# Patient Record
Sex: MALE | Race: WHITE
[De-identification: names, ages, dates, MRNs, and addresses within clinical notes are randomized per-mention and may not be internally consistent; named-entity substitution may affect disease eponyms.]

---

## 2018-01-02 PROBLEM — Z00.00 ENCOUNTER FOR PREVENTIVE HEALTH EXAMINATION: Status: ACTIVE | Noted: 2018-01-02

## 2019-05-20 ENCOUNTER — RECORD ABSTRACTING (OUTPATIENT)
Age: 74
End: 2019-05-20

## 2019-05-20 DIAGNOSIS — Z87.898 PERSONAL HISTORY OF OTHER SPECIFIED CONDITIONS: ICD-10-CM

## 2019-05-20 DIAGNOSIS — Z78.9 OTHER SPECIFIED HEALTH STATUS: ICD-10-CM

## 2019-05-20 DIAGNOSIS — Z92.21 PERSONAL HISTORY OF ANTINEOPLASTIC CHEMOTHERAPY: ICD-10-CM

## 2019-05-20 DIAGNOSIS — Z87.891 PERSONAL HISTORY OF NICOTINE DEPENDENCE: ICD-10-CM

## 2019-05-20 DIAGNOSIS — E07.9 DISORDER OF THYROID, UNSPECIFIED: ICD-10-CM

## 2019-05-20 DIAGNOSIS — I25.2 OLD MYOCARDIAL INFARCTION: ICD-10-CM

## 2019-05-20 DIAGNOSIS — Z80.9 FAMILY HISTORY OF MALIGNANT NEOPLASM, UNSPECIFIED: ICD-10-CM

## 2019-05-20 DIAGNOSIS — Z85.9 PERSONAL HISTORY OF MALIGNANT NEOPLASM, UNSPECIFIED: ICD-10-CM

## 2019-05-20 DIAGNOSIS — Z86.73 PERSONAL HISTORY OF TRANSIENT ISCHEMIC ATTACK (TIA), AND CEREBRAL INFARCTION W/OUT RESIDUAL DEFICITS: ICD-10-CM

## 2019-05-20 DIAGNOSIS — K21.9 GASTRO-ESOPHAGEAL REFLUX DISEASE W/OUT ESOPHAGITIS: ICD-10-CM

## 2019-05-20 DIAGNOSIS — Z86.2 PERSONAL HISTORY OF DISEASES OF THE BLOOD AND BLOOD-FORMING ORGANS AND CERTAIN DISORDERS INVOLVING THE IMMUNE MECHANISM: ICD-10-CM

## 2019-05-20 DIAGNOSIS — Z87.19 PERSONAL HISTORY OF OTHER DISEASES OF THE DIGESTIVE SYSTEM: ICD-10-CM

## 2019-05-20 DIAGNOSIS — E78.00 PURE HYPERCHOLESTEROLEMIA, UNSPECIFIED: ICD-10-CM

## 2019-05-20 DIAGNOSIS — Z82.49 FAMILY HISTORY OF ISCHEMIC HEART DISEASE AND OTHER DISEASES OF THE CIRCULATORY SYSTEM: ICD-10-CM

## 2019-05-29 ENCOUNTER — RECORD ABSTRACTING (OUTPATIENT)
Age: 74
End: 2019-05-29

## 2019-05-29 DIAGNOSIS — Z83.3 FAMILY HISTORY OF DIABETES MELLITUS: ICD-10-CM

## 2019-05-29 DIAGNOSIS — Z86.79 PERSONAL HISTORY OF OTHER DISEASES OF THE CIRCULATORY SYSTEM: ICD-10-CM

## 2019-05-29 DIAGNOSIS — Z95.810 PRESENCE OF AUTOMATIC (IMPLANTABLE) CARDIAC DEFIBRILLATOR: ICD-10-CM

## 2019-05-29 DIAGNOSIS — Z87.891 PERSONAL HISTORY OF NICOTINE DEPENDENCE: ICD-10-CM

## 2019-06-06 ENCOUNTER — RX RENEWAL (OUTPATIENT)
Age: 74
End: 2019-06-06

## 2019-06-14 ENCOUNTER — RX CHANGE (OUTPATIENT)
Age: 74
End: 2019-06-14

## 2019-06-14 RX ORDER — EMPAGLIFLOZIN, METFORMIN HYDROCHLORIDE 12.5; 1 MG/1; MG/1
12.5-1 TABLET, EXTENDED RELEASE ORAL
Qty: 180 | Refills: 0 | Status: DISCONTINUED | COMMUNITY
Start: 1900-01-01 | End: 2019-06-14

## 2019-08-05 ENCOUNTER — RX RENEWAL (OUTPATIENT)
Age: 74
End: 2019-08-05

## 2019-09-11 ENCOUNTER — APPOINTMENT (OUTPATIENT)
Dept: FAMILY MEDICINE | Facility: CLINIC | Age: 74
End: 2019-09-11
Payer: COMMERCIAL

## 2019-09-11 VITALS
WEIGHT: 193.25 LBS | HEART RATE: 87 BPM | HEIGHT: 64.5 IN | TEMPERATURE: 97.7 F | BODY MASS INDEX: 32.59 KG/M2 | OXYGEN SATURATION: 98 %

## 2019-09-11 VITALS — SYSTOLIC BLOOD PRESSURE: 127 MMHG | DIASTOLIC BLOOD PRESSURE: 70 MMHG

## 2019-09-11 PROCEDURE — 99213 OFFICE O/P EST LOW 20 MIN: CPT

## 2019-09-11 RX ORDER — GLIPIZIDE 5 MG/1
5 TABLET, FILM COATED, EXTENDED RELEASE ORAL
Refills: 0 | Status: DISCONTINUED | COMMUNITY
End: 2019-09-11

## 2019-09-11 NOTE — PHYSICAL EXAM
[No Acute Distress] : no acute distress [Well Nourished] : well nourished [Well Developed] : well developed [Well-Appearing] : well-appearing [Normal Sclera/Conjunctiva] : normal sclera/conjunctiva [PERRL] : pupils equal round and reactive to light [EOMI] : extraocular movements intact [Normal Outer Ear/Nose] : the outer ears and nose were normal in appearance [Normal Oropharynx] : the oropharynx was normal [No JVD] : no jugular venous distention [No Lymphadenopathy] : no lymphadenopathy [Supple] : supple [Thyroid Normal, No Nodules] : the thyroid was normal and there were no nodules present [No Accessory Muscle Use] : no accessory muscle use [No Respiratory Distress] : no respiratory distress  [Clear to Auscultation] : lungs were clear to auscultation bilaterally [Normal Rate] : normal rate  [Regular Rhythm] : with a regular rhythm [Normal S1, S2] : normal S1 and S2 [No Carotid Bruits] : no carotid bruits [No Murmur] : no murmur heard [No Abdominal Bruit] : a ~M bruit was not heard ~T in the abdomen [No Varicosities] : no varicosities [Pedal Pulses Present] : the pedal pulses are present [No Edema] : there was no peripheral edema [No Palpable Aorta] : no palpable aorta [No Extremity Clubbing/Cyanosis] : no extremity clubbing/cyanosis [Soft] : abdomen soft [Non Tender] : non-tender [Non-distended] : non-distended [No Masses] : no abdominal mass palpated [No HSM] : no HSM [Normal Bowel Sounds] : normal bowel sounds [Normal Posterior Cervical Nodes] : no posterior cervical lymphadenopathy [Normal Anterior Cervical Nodes] : no anterior cervical lymphadenopathy [No CVA Tenderness] : no CVA  tenderness [No Spinal Tenderness] : no spinal tenderness [No Joint Swelling] : no joint swelling [Grossly Normal Strength/Tone] : grossly normal strength/tone [No Rash] : no rash [Coordination Grossly Intact] : coordination grossly intact [No Focal Deficits] : no focal deficits [Normal Gait] : normal gait [Deep Tendon Reflexes (DTR)] : deep tendon reflexes were 2+ and symmetric [Normal Affect] : the affect was normal [Normal Insight/Judgement] : insight and judgment were intact

## 2019-12-11 ENCOUNTER — APPOINTMENT (OUTPATIENT)
Dept: FAMILY MEDICINE | Facility: CLINIC | Age: 74
End: 2019-12-11
Payer: COMMERCIAL

## 2019-12-11 VITALS — DIASTOLIC BLOOD PRESSURE: 75 MMHG | SYSTOLIC BLOOD PRESSURE: 125 MMHG

## 2019-12-11 VITALS
BODY MASS INDEX: 33.19 KG/M2 | HEIGHT: 64 IN | OXYGEN SATURATION: 97 % | WEIGHT: 194.4 LBS | HEART RATE: 84 BPM | RESPIRATION RATE: 12 BRPM | TEMPERATURE: 97.6 F

## 2019-12-11 PROCEDURE — 90670 PCV13 VACCINE IM: CPT

## 2019-12-11 PROCEDURE — G0009: CPT

## 2019-12-11 PROCEDURE — 99213 OFFICE O/P EST LOW 20 MIN: CPT | Mod: 25

## 2019-12-11 NOTE — ASSESSMENT
[FreeTextEntry1] : WF for recheck of blood sugars (which have not been in good control not watching diet). Bp eval well controlled. Will renew meds and [id number] requested.

## 2019-12-11 NOTE — PHYSICAL EXAM
[No Acute Distress] : no acute distress [Well Nourished] : well nourished [Well Developed] : well developed [Well-Appearing] : well-appearing [Normal Sclera/Conjunctiva] : normal sclera/conjunctiva [PERRL] : pupils equal round and reactive to light [EOMI] : extraocular movements intact [Normal Outer Ear/Nose] : the outer ears and nose were normal in appearance [Normal Oropharynx] : the oropharynx was normal [No JVD] : no jugular venous distention [No Lymphadenopathy] : no lymphadenopathy [Supple] : supple [No Respiratory Distress] : no respiratory distress  [Thyroid Normal, No Nodules] : the thyroid was normal and there were no nodules present [No Accessory Muscle Use] : no accessory muscle use [Normal Rate] : normal rate  [Regular Rhythm] : with a regular rhythm [Clear to Auscultation] : lungs were clear to auscultation bilaterally [No Carotid Bruits] : no carotid bruits [Normal S1, S2] : normal S1 and S2 [No Murmur] : no murmur heard [No Abdominal Bruit] : a ~M bruit was not heard ~T in the abdomen [Pedal Pulses Present] : the pedal pulses are present [No Varicosities] : no varicosities [No Edema] : there was no peripheral edema [No Palpable Aorta] : no palpable aorta [No Extremity Clubbing/Cyanosis] : no extremity clubbing/cyanosis [Soft] : abdomen soft [Non Tender] : non-tender [Non-distended] : non-distended [No Masses] : no abdominal mass palpated [No HSM] : no HSM [Normal Bowel Sounds] : normal bowel sounds [Normal Anterior Cervical Nodes] : no anterior cervical lymphadenopathy [Normal Posterior Cervical Nodes] : no posterior cervical lymphadenopathy [No Joint Swelling] : no joint swelling [No CVA Tenderness] : no CVA  tenderness [No Spinal Tenderness] : no spinal tenderness [Coordination Grossly Intact] : coordination grossly intact [No Rash] : no rash [Grossly Normal Strength/Tone] : grossly normal strength/tone [Normal Gait] : normal gait [Deep Tendon Reflexes (DTR)] : deep tendon reflexes were 2+ and symmetric [No Focal Deficits] : no focal deficits [Normal Insight/Judgement] : insight and judgment were intact [Normal Affect] : the affect was normal

## 2020-03-11 ENCOUNTER — APPOINTMENT (OUTPATIENT)
Dept: FAMILY MEDICINE | Facility: CLINIC | Age: 75
End: 2020-03-11
Payer: COMMERCIAL

## 2020-03-11 VITALS
TEMPERATURE: 98 F | HEIGHT: 64 IN | BODY MASS INDEX: 33.29 KG/M2 | HEART RATE: 84 BPM | OXYGEN SATURATION: 98 % | RESPIRATION RATE: 12 BRPM | WEIGHT: 195 LBS

## 2020-03-11 VITALS — DIASTOLIC BLOOD PRESSURE: 65 MMHG | SYSTOLIC BLOOD PRESSURE: 120 MMHG

## 2020-03-11 PROCEDURE — 36415 COLL VENOUS BLD VENIPUNCTURE: CPT

## 2020-03-11 PROCEDURE — 99213 OFFICE O/P EST LOW 20 MIN: CPT | Mod: 25

## 2020-03-11 PROCEDURE — 81003 URINALYSIS AUTO W/O SCOPE: CPT | Mod: QW

## 2020-03-13 LAB
ALBUMIN SERPL ELPH-MCNC: 4.7 G/DL
ALP BLD-CCNC: 71 U/L
ALT SERPL-CCNC: 66 U/L
ANION GAP SERPL CALC-SCNC: 21 MMOL/L
AST SERPL-CCNC: 47 U/L
BASOPHILS # BLD AUTO: 0.06 K/UL
BASOPHILS NFR BLD AUTO: 0.9 %
BILIRUB SERPL-MCNC: 0.4 MG/DL
BILIRUB UR QL STRIP: NEGATIVE
BUN SERPL-MCNC: 21 MG/DL
CALCIUM SERPL-MCNC: 9.4 MG/DL
CHLORIDE SERPL-SCNC: 100 MMOL/L
CHOLEST SERPL-MCNC: 122 MG/DL
CHOLEST/HDLC SERPL: 3.8 RATIO
CLARITY UR: CLEAR
CO2 SERPL-SCNC: 19 MMOL/L
COLLECTION METHOD: NORMAL
CREAT SERPL-MCNC: 1.17 MG/DL
EOSINOPHIL # BLD AUTO: 0.2 K/UL
EOSINOPHIL NFR BLD AUTO: 3.1 %
ESTIMATED AVERAGE GLUCOSE: 166 MG/DL
GLUCOSE SERPL-MCNC: 153 MG/DL
GLUCOSE UR-MCNC: NORMAL
HBA1C MFR BLD HPLC: 7.4 %
HCG UR QL: 0.2 EU/DL
HCT VFR BLD CALC: 48.2 %
HDLC SERPL-MCNC: 32 MG/DL
HGB BLD-MCNC: 15.8 G/DL
HGB UR QL STRIP.AUTO: NEGATIVE
IMM GRANULOCYTES NFR BLD AUTO: 0.3 %
KETONES UR-MCNC: NEGATIVE
LDLC SERPL CALC-MCNC: 14 MG/DL
LEUKOCYTE ESTERASE UR QL STRIP: NEGATIVE
LYMPHOCYTES # BLD AUTO: 2.58 K/UL
LYMPHOCYTES NFR BLD AUTO: 40.2 %
MAN DIFF?: NORMAL
MCHC RBC-ENTMCNC: 30.3 PG
MCHC RBC-ENTMCNC: 32.8 GM/DL
MCV RBC AUTO: 92.5 FL
MONOCYTES # BLD AUTO: 0.63 K/UL
MONOCYTES NFR BLD AUTO: 9.8 %
NEUTROPHILS # BLD AUTO: 2.93 K/UL
NEUTROPHILS NFR BLD AUTO: 45.7 %
NITRITE UR QL STRIP: NEGATIVE
PH UR STRIP: 6
PLATELET # BLD AUTO: 176 K/UL
POTASSIUM SERPL-SCNC: 3.7 MMOL/L
PROT SERPL-MCNC: 7.2 G/DL
PROT UR STRIP-MCNC: NEGATIVE
PSA FREE FLD-MCNC: 34 %
PSA FREE SERPL-MCNC: 0.62 NG/ML
PSA SERPL-MCNC: 1.86 NG/ML
RBC # BLD: 5.21 M/UL
RBC # FLD: 13.2 %
SODIUM SERPL-SCNC: 140 MMOL/L
SP GR UR STRIP: 1.01
T4 FREE SERPL-MCNC: 1.2 NG/DL
TRIGL SERPL-MCNC: 382 MG/DL
TSH SERPL-ACNC: 2.05 UIU/ML
WBC # FLD AUTO: 6.42 K/UL

## 2020-07-27 ENCOUNTER — APPOINTMENT (OUTPATIENT)
Dept: FAMILY MEDICINE | Facility: CLINIC | Age: 75
End: 2020-07-27
Payer: COMMERCIAL

## 2020-07-27 VITALS
BODY MASS INDEX: 33.13 KG/M2 | WEIGHT: 193 LBS | OXYGEN SATURATION: 97 % | RESPIRATION RATE: 12 BRPM | HEART RATE: 88 BPM | TEMPERATURE: 98 F

## 2020-07-27 VITALS — DIASTOLIC BLOOD PRESSURE: 72 MMHG | SYSTOLIC BLOOD PRESSURE: 120 MMHG

## 2020-07-27 PROCEDURE — 99213 OFFICE O/P EST LOW 20 MIN: CPT

## 2020-07-27 NOTE — PLAN
[FreeTextEntry1] : This is dictation summary on this 74-year-old white male Tom palmer who is coming to have his blood pressure and prescriptions are renewed the patient feels very very well no complaints he has a history of hypertension and diabetes his blood pressure was excellent today it was O1 25/72 his heart had a regular rhythm S1-S2 lungs were clear neck was supple as mentioned he has no complaints his blood pressure medication was changed from valsartan to low saw a cardiologist because they're having trouble getting the L. side his medications are renewed and dry to 12.5/500 as well as she dorsiflexed touch 100 unit TENS the patient as mentioned was asymptomatic and has been instructed to return to the office in 3 months his prescriptions were refilled today 5 diagnoses hypertension diabetes

## 2020-09-29 DIAGNOSIS — Z23 ENCOUNTER FOR IMMUNIZATION: ICD-10-CM

## 2020-09-30 ENCOUNTER — APPOINTMENT (OUTPATIENT)
Dept: FAMILY MEDICINE | Facility: CLINIC | Age: 75
End: 2020-09-30
Payer: COMMERCIAL

## 2020-09-30 PROCEDURE — G0008: CPT

## 2020-09-30 PROCEDURE — 90662 IIV NO PRSV INCREASED AG IM: CPT

## 2020-10-01 NOTE — PROCEDURE
[Injection] : Injection [Left] : on the left.   [Bleeding] : bleeding [Infection] : infection [Allergic Reaction] : allergic reaction [Patient] : patient [Risk] : Risk [Benefits] : benefits [Verbal Consent Obtained] : verbal consent was obtained prior to the procedure [Alcohol] : Alcohol [Bandage Applied] : a bandage [Tolerated Well] : the patient tolerated the procedure well [None] : None [de-identified] : deltoid [de-identified] : health maintenance [FreeTextEntry1] : seasonal flu vaccine 65+ [FreeTextEntry2] : health maintenance [FreeTextEntry3] : Pt presents to office for seaonal flu vaccine. Patient is asymptomatic, Denies fever, hx of Guillian-Assonet Syndrome or allergy to eggs. Provided patient with education regarding seasonal flu vaccine. Pt tolerated injection with no s/s of adverse reactions noted at time of visit.

## 2020-10-01 NOTE — REASON FOR VISIT
[Procedure: _________] : a [unfilled] procedure visit [FreeTextEntry1] : patient is here for seasonal flu vaccine.

## 2020-10-28 ENCOUNTER — NON-APPOINTMENT (OUTPATIENT)
Age: 75
End: 2020-10-28

## 2020-11-04 ENCOUNTER — APPOINTMENT (OUTPATIENT)
Dept: FAMILY MEDICINE | Facility: CLINIC | Age: 75
End: 2020-11-04
Payer: COMMERCIAL

## 2020-11-04 VITALS
TEMPERATURE: 98 F | BODY MASS INDEX: 33.78 KG/M2 | RESPIRATION RATE: 12 BRPM | HEART RATE: 78 BPM | OXYGEN SATURATION: 98 % | WEIGHT: 196.8 LBS

## 2020-11-04 VITALS — SYSTOLIC BLOOD PRESSURE: 135 MMHG | DIASTOLIC BLOOD PRESSURE: 75 MMHG

## 2020-11-04 PROCEDURE — 99213 OFFICE O/P EST LOW 20 MIN: CPT | Mod: 25

## 2020-11-04 PROCEDURE — 99072 ADDL SUPL MATRL&STAF TM PHE: CPT

## 2020-11-04 NOTE — ASSESSMENT
[FreeTextEntry1] : This is a narrative dictation on this 75-year-old male history of diabetes and hypertension who is coming for refill of his diabetic medications he feels well he brought in his blood sugars which he checks twice a day every day and they have been very high and he realizes that he admits also that he just eats like there is Dr. Guillaume at home now working at home and he sits all day the nausea cc coughs septum was in [place] her sugars are high he would he want to tell you that I can do is increase his medications so he says is going to try harder\par I with everything the way this pressure was excellent 135/75 is a regular rhythm S1-S2 lungs are clear showed sugars a lot of times it is in the 200 range I renewed his medications as is he'll return to the office for another recheck and brings his hopefully his numbers with him

## 2020-11-05 ENCOUNTER — NON-APPOINTMENT (OUTPATIENT)
Age: 75
End: 2020-11-05

## 2020-11-06 ENCOUNTER — NON-APPOINTMENT (OUTPATIENT)
Age: 75
End: 2020-11-06

## 2020-11-12 ENCOUNTER — NON-APPOINTMENT (OUTPATIENT)
Age: 75
End: 2020-11-12

## 2021-02-03 ENCOUNTER — NON-APPOINTMENT (OUTPATIENT)
Age: 76
End: 2021-02-03

## 2021-02-03 ENCOUNTER — APPOINTMENT (OUTPATIENT)
Dept: FAMILY MEDICINE | Facility: CLINIC | Age: 76
End: 2021-02-03
Payer: COMMERCIAL

## 2021-02-03 VITALS
OXYGEN SATURATION: 98 % | TEMPERATURE: 95.5 F | HEART RATE: 78 BPM | RESPIRATION RATE: 12 BRPM | BODY MASS INDEX: 33.3 KG/M2 | WEIGHT: 194 LBS

## 2021-02-03 VITALS — DIASTOLIC BLOOD PRESSURE: 75 MMHG | SYSTOLIC BLOOD PRESSURE: 130 MMHG

## 2021-02-03 PROCEDURE — 99212 OFFICE O/P EST SF 10 MIN: CPT

## 2021-02-03 PROCEDURE — 99072 ADDL SUPL MATRL&STAF TM PHE: CPT

## 2021-02-03 NOTE — ASSESSMENT
[FreeTextEntry1] : Visit narrative dictation for this 75-year-old white male who is coming today for just a little checkup he was recently seen by his cardiologist who was supposed to have a forwarded to live status switch to we have not received yet so were rather than B. repeat some blood tests on here waiting to get the fax from the cardiologist and the patient will come back and I will fill in and whatever lab studies I want one of which will include a hemoglobin A1c the patient has a history of diabetes center and holidays he sort of did not follow her diet as well as I would've liked to however he says he is back to watching his diet and we will see how he uses next appointment which is approximately 3 months from now did not require a refill on any medications at this time and as mentioned I will see him back in the office his blood pressure was 130/75

## 2021-02-17 ENCOUNTER — RX RENEWAL (OUTPATIENT)
Age: 76
End: 2021-02-17

## 2021-05-05 ENCOUNTER — APPOINTMENT (OUTPATIENT)
Dept: FAMILY MEDICINE | Facility: CLINIC | Age: 76
End: 2021-05-05
Payer: COMMERCIAL

## 2021-05-05 VITALS
RESPIRATION RATE: 14 BRPM | HEART RATE: 80 BPM | OXYGEN SATURATION: 98 % | TEMPERATURE: 97.2 F | BODY MASS INDEX: 33.71 KG/M2 | WEIGHT: 196.4 LBS

## 2021-05-05 PROCEDURE — 36415 COLL VENOUS BLD VENIPUNCTURE: CPT

## 2021-05-05 PROCEDURE — 99213 OFFICE O/P EST LOW 20 MIN: CPT | Mod: 25

## 2021-05-05 PROCEDURE — 99072 ADDL SUPL MATRL&STAF TM PHE: CPT

## 2021-05-05 NOTE — ASSESSMENT
[FreeTextEntry1] : As an out of dictation this 75-year-old male who is coming in today for reevaluation patient has a history of hypertension and diabetes he sugars he states later in the day are very normal range definitely under 200 sometimes in the 130 range however his fasting sugars in the morning still in the 150+ range  he swears to me he isn't having any carbs at bedtime  his blood pressure was normal I renewed his prescriptions decided to send him to an endocrinologist for further evaluation patient agrees to this a name has been given to him to make an appointment

## 2021-05-07 LAB
ESTIMATED AVERAGE GLUCOSE: 183 MG/DL
HBA1C MFR BLD HPLC: 8 %

## 2021-05-19 ENCOUNTER — RX RENEWAL (OUTPATIENT)
Age: 76
End: 2021-05-19

## 2021-07-06 ENCOUNTER — RX RENEWAL (OUTPATIENT)
Age: 76
End: 2021-07-06

## 2021-08-03 ENCOUNTER — RX RENEWAL (OUTPATIENT)
Age: 76
End: 2021-08-03

## 2021-08-11 ENCOUNTER — LABORATORY RESULT (OUTPATIENT)
Age: 76
End: 2021-08-11

## 2021-08-11 ENCOUNTER — APPOINTMENT (OUTPATIENT)
Dept: FAMILY MEDICINE | Facility: CLINIC | Age: 76
End: 2021-08-11
Payer: COMMERCIAL

## 2021-08-11 VITALS
WEIGHT: 198.4 LBS | HEART RATE: 82 BPM | TEMPERATURE: 96.5 F | RESPIRATION RATE: 14 BRPM | OXYGEN SATURATION: 98 % | BODY MASS INDEX: 34.06 KG/M2

## 2021-08-11 VITALS — DIASTOLIC BLOOD PRESSURE: 75 MMHG | SYSTOLIC BLOOD PRESSURE: 125 MMHG

## 2021-08-11 DIAGNOSIS — I10 ESSENTIAL (PRIMARY) HYPERTENSION: ICD-10-CM

## 2021-08-11 DIAGNOSIS — E11.9 TYPE 2 DIABETES MELLITUS W/OUT COMPLICATIONS: ICD-10-CM

## 2021-08-11 PROCEDURE — 99213 OFFICE O/P EST LOW 20 MIN: CPT | Mod: 25

## 2021-08-11 PROCEDURE — 81003 URINALYSIS AUTO W/O SCOPE: CPT | Mod: QW

## 2021-08-11 PROCEDURE — 36415 COLL VENOUS BLD VENIPUNCTURE: CPT

## 2021-08-11 NOTE — ASSESSMENT
[FreeTextEntry1] : As an out of dictation for this 75-year-old white male who was come in for blood pressure recheck and to go over his diabetes status he also is requesting some lab studies which he has had a long talk he showed me his sugar controls which are averaging approximately 150 7 in the evening and 150 average in the morning his blood pressure one was 125/75 his heart had a regular rhythm S1-S2 lungs were clear I stressed to the patient how important it is to stand a low-carb diet is telling me it's difficult during the pain then it because he's home and gets up all the time and wants the surgery though I can explain to him with a moderately half he says he will try ID comprehensive lab studies on him today including a hemoglobin A1c he has enough prescriptions and that he will be notified of the results when they return to me

## 2021-08-16 ENCOUNTER — RX RENEWAL (OUTPATIENT)
Age: 76
End: 2021-08-16

## 2021-08-18 LAB
ALBUMIN SERPL ELPH-MCNC: 5 G/DL
ALP BLD-CCNC: 88 U/L
ALT SERPL-CCNC: 86 U/L
ANION GAP SERPL CALC-SCNC: 15 MMOL/L
AST SERPL-CCNC: 58 U/L
BASOPHILS # BLD AUTO: 0.06 K/UL
BASOPHILS NFR BLD AUTO: 0.8 %
BILIRUB SERPL-MCNC: 0.6 MG/DL
BILIRUB UR QL STRIP: NORMAL
BUN SERPL-MCNC: 19 MG/DL
CALCIUM SERPL-MCNC: 9.6 MG/DL
CHLORIDE SERPL-SCNC: 101 MMOL/L
CHOLEST SERPL-MCNC: 140 MG/DL
CLARITY UR: CLEAR
CO2 SERPL-SCNC: 22 MMOL/L
COLLECTION METHOD: NORMAL
CREAT SERPL-MCNC: 1.03 MG/DL
EOSINOPHIL # BLD AUTO: 0.25 K/UL
EOSINOPHIL NFR BLD AUTO: 3.1 %
ESTIMATED AVERAGE GLUCOSE: 206 MG/DL
GLUCOSE SERPL-MCNC: 114 MG/DL
GLUCOSE UR-MCNC: NORMAL
HBA1C MFR BLD HPLC: 8.8 %
HCG UR QL: 0.2 EU/DL
HCT VFR BLD CALC: 47.9 %
HDLC SERPL-MCNC: 33 MG/DL
HGB BLD-MCNC: 15.9 G/DL
HGB UR QL STRIP.AUTO: NORMAL
IMM GRANULOCYTES NFR BLD AUTO: 0.5 %
KETONES UR-MCNC: NORMAL
LDLC SERPL CALC-MCNC: 45 MG/DL
LEUKOCYTE ESTERASE UR QL STRIP: NORMAL
LYMPHOCYTES # BLD AUTO: 3.39 K/UL
LYMPHOCYTES NFR BLD AUTO: 42.5 %
MAN DIFF?: NORMAL
MCHC RBC-ENTMCNC: 31.2 PG
MCHC RBC-ENTMCNC: 33.2 GM/DL
MCV RBC AUTO: 94.1 FL
MONOCYTES # BLD AUTO: 0.59 K/UL
MONOCYTES NFR BLD AUTO: 7.4 %
NEUTROPHILS # BLD AUTO: 3.64 K/UL
NEUTROPHILS NFR BLD AUTO: 45.7 %
NITRITE UR QL STRIP: NORMAL
NONHDLC SERPL-MCNC: 107 MG/DL
PH UR STRIP: 6
PLATELET # BLD AUTO: 175 K/UL
POTASSIUM SERPL-SCNC: 3.9 MMOL/L
PROT SERPL-MCNC: 7.3 G/DL
PROT UR STRIP-MCNC: NORMAL
PSA FREE FLD-MCNC: 34 %
PSA FREE SERPL-MCNC: 0.62 NG/ML
PSA SERPL-MCNC: 1.82 NG/ML
RBC # BLD: 5.09 M/UL
RBC # FLD: 13.2 %
SODIUM SERPL-SCNC: 138 MMOL/L
SP GR UR STRIP: 1.02
T4 FREE SERPL-MCNC: 1.2 NG/DL
TRIGL SERPL-MCNC: 311 MG/DL
TSH SERPL-ACNC: 1.8 UIU/ML
WBC # FLD AUTO: 7.97 K/UL

## 2021-08-19 ENCOUNTER — NON-APPOINTMENT (OUTPATIENT)
Age: 76
End: 2021-08-19

## 2021-08-30 ENCOUNTER — APPOINTMENT (OUTPATIENT)
Dept: FAMILY MEDICINE | Facility: CLINIC | Age: 76
End: 2021-08-30
Payer: COMMERCIAL

## 2021-08-30 VITALS — HEART RATE: 74 BPM | RESPIRATION RATE: 14 BRPM | TEMPERATURE: 97.1 F | OXYGEN SATURATION: 98 %

## 2021-08-30 DIAGNOSIS — Z91.030 BEE ALLERGY STATUS: ICD-10-CM

## 2021-08-30 PROCEDURE — 99213 OFFICE O/P EST LOW 20 MIN: CPT

## 2021-08-30 RX ORDER — EPINEPHRINE 0.3 MG/.3ML
0.3 INJECTION INTRAMUSCULAR
Qty: 1 | Refills: 0 | Status: ACTIVE | COMMUNITY
Start: 2021-08-30 | End: 1900-01-01

## 2021-08-30 NOTE — HISTORY OF PRESENT ILLNESS
[Post-hospitalization from ___ Hospital] : Post-hospitalization from [unfilled] Hospital [Admitted on: ___] : The patient was admitted on [unfilled] [Discharged on ___] : discharged on [unfilled]

## 2021-09-08 ENCOUNTER — APPOINTMENT (OUTPATIENT)
Dept: FAMILY MEDICINE | Facility: CLINIC | Age: 76
End: 2021-09-08
Payer: COMMERCIAL

## 2021-09-08 VITALS — TEMPERATURE: 97.2 F | HEART RATE: 83 BPM | OXYGEN SATURATION: 97 %

## 2021-09-08 DIAGNOSIS — L25.9 UNSPECIFIED CONTACT DERMATITIS, UNSPECIFIED CAUSE: ICD-10-CM

## 2021-09-08 PROCEDURE — 36415 COLL VENOUS BLD VENIPUNCTURE: CPT

## 2021-09-08 PROCEDURE — 99213 OFFICE O/P EST LOW 20 MIN: CPT | Mod: 25

## 2021-09-08 RX ORDER — METHYLPREDNISOLONE 4 MG/1
4 TABLET ORAL
Qty: 1 | Refills: 0 | Status: ACTIVE | COMMUNITY
Start: 2021-09-08 | End: 1900-01-01

## 2021-09-08 NOTE — ASSESSMENT
[FreeTextEntry1] : Visit narrative dictation on this 75-year-old male who came in with a swollen right face with severe itching the rash is a generalized erythema with some swelling however it's only in the area and the skin is exposed to sunlight durations by his neck in a V. shape but doesn't go lower back to his chest back or extremities it appears to be to be a contact dermatitis of the Zoloft tight the only thing that he takes on his face as a cosmetic-like material his blood soap I told to immediately stop it and to use basis soap and moisturizer something simple like Lubriderm orthopedic cream we will send out a CBC with differential to see what the eosinophil count is at time on a Medrol dose pack and the patient is diabetic so I told him to watch his sugars because the prednisone will make his sugars go up we will get a dermatologist to see him he is having trouble getting a dermatologist at the earliest appointment he can get his over month we will contact tomorrow

## 2021-09-09 LAB
BASOPHILS # BLD AUTO: 0.09 K/UL
BASOPHILS NFR BLD AUTO: 1 %
EOSINOPHIL # BLD AUTO: 0.65 K/UL
EOSINOPHIL NFR BLD AUTO: 7.2 %
HCT VFR BLD CALC: 49.4 %
HGB BLD-MCNC: 16.1 G/DL
IMM GRANULOCYTES NFR BLD AUTO: 0.4 %
LYMPHOCYTES # BLD AUTO: 3.09 K/UL
LYMPHOCYTES NFR BLD AUTO: 34.4 %
MAN DIFF?: NORMAL
MCHC RBC-ENTMCNC: 31.3 PG
MCHC RBC-ENTMCNC: 32.6 GM/DL
MCV RBC AUTO: 95.9 FL
MONOCYTES # BLD AUTO: 0.7 K/UL
MONOCYTES NFR BLD AUTO: 7.8 %
NEUTROPHILS # BLD AUTO: 4.4 K/UL
NEUTROPHILS NFR BLD AUTO: 49.2 %
PLATELET # BLD AUTO: 179 K/UL
RBC # BLD: 5.15 M/UL
RBC # FLD: 13.6 %
WBC # FLD AUTO: 8.97 K/UL

## 2021-09-10 ENCOUNTER — APPOINTMENT (OUTPATIENT)
Dept: DERMATOLOGY | Facility: CLINIC | Age: 76
End: 2021-09-10
Payer: COMMERCIAL

## 2021-09-10 PROCEDURE — 99204 OFFICE O/P NEW MOD 45 MIN: CPT

## 2021-09-17 ENCOUNTER — APPOINTMENT (OUTPATIENT)
Dept: DERMATOLOGY | Facility: CLINIC | Age: 76
End: 2021-09-17

## 2021-09-23 ENCOUNTER — APPOINTMENT (OUTPATIENT)
Dept: DERMATOLOGY | Facility: CLINIC | Age: 76
End: 2021-09-23
Payer: COMMERCIAL

## 2021-09-23 PROCEDURE — 99213 OFFICE O/P EST LOW 20 MIN: CPT

## 2021-09-29 ENCOUNTER — APPOINTMENT (OUTPATIENT)
Dept: FAMILY MEDICINE | Facility: CLINIC | Age: 76
End: 2021-09-29

## 2021-09-29 ENCOUNTER — APPOINTMENT (OUTPATIENT)
Dept: FAMILY MEDICINE | Facility: CLINIC | Age: 76
End: 2021-09-29
Payer: COMMERCIAL

## 2021-09-29 PROCEDURE — G0008: CPT

## 2021-09-29 PROCEDURE — 90662 IIV NO PRSV INCREASED AG IM: CPT

## 2021-10-20 ENCOUNTER — RX RENEWAL (OUTPATIENT)
Age: 76
End: 2021-10-20

## 2021-11-10 ENCOUNTER — APPOINTMENT (OUTPATIENT)
Dept: FAMILY MEDICINE | Facility: CLINIC | Age: 76
End: 2021-11-10
Payer: COMMERCIAL

## 2021-11-10 VITALS
WEIGHT: 198.4 LBS | BODY MASS INDEX: 34.06 KG/M2 | TEMPERATURE: 97.9 F | RESPIRATION RATE: 14 BRPM | HEART RATE: 83 BPM | OXYGEN SATURATION: 98 %

## 2021-11-10 VITALS — SYSTOLIC BLOOD PRESSURE: 125 MMHG | DIASTOLIC BLOOD PRESSURE: 65 MMHG

## 2021-11-10 PROCEDURE — 99214 OFFICE O/P EST MOD 30 MIN: CPT

## 2021-11-10 NOTE — ASSESSMENT
[FreeTextEntry1] : Visit notification for a 76-year-old male who is coming today to go over his diabetic regimen patient sugars are going higher he admits that he is not following any diet and is eating quite a bit more his weight hasn't changed his weight in at about 186 pounds plus pressure however was excellent 125/65 somewhat irregular rhythm S1-S2 his lungs are clear we had a talk about diet and the consequences of not following a diet and just increasing the dosage he was taken to see about 40 units and increased now to 45 units and he will return in 3 months but he will be weight again in a couple weeks he's coming in for his covid booster

## 2021-11-15 ENCOUNTER — RX RENEWAL (OUTPATIENT)
Age: 76
End: 2021-11-15

## 2021-11-19 ENCOUNTER — APPOINTMENT (OUTPATIENT)
Dept: FAMILY MEDICINE | Facility: CLINIC | Age: 76
End: 2021-11-19

## 2022-01-24 ENCOUNTER — RX RENEWAL (OUTPATIENT)
Age: 77
End: 2022-01-24

## 2022-01-26 ENCOUNTER — RX RENEWAL (OUTPATIENT)
Age: 77
End: 2022-01-26

## 2022-02-14 ENCOUNTER — RX RENEWAL (OUTPATIENT)
Age: 77
End: 2022-02-14

## 2022-03-16 ENCOUNTER — APPOINTMENT (OUTPATIENT)
Dept: FAMILY MEDICINE | Facility: CLINIC | Age: 77
End: 2022-03-16
Payer: COMMERCIAL

## 2022-03-16 VITALS
TEMPERATURE: 97.6 F | BODY MASS INDEX: 33.46 KG/M2 | OXYGEN SATURATION: 97 % | HEART RATE: 80 BPM | WEIGHT: 196 LBS | HEIGHT: 64 IN

## 2022-03-16 VITALS — DIASTOLIC BLOOD PRESSURE: 70 MMHG | SYSTOLIC BLOOD PRESSURE: 130 MMHG

## 2022-03-16 PROCEDURE — 99214 OFFICE O/P EST MOD 30 MIN: CPT

## 2022-03-16 RX ORDER — BLOOD SUGAR DIAGNOSTIC
STRIP MISCELLANEOUS
Qty: 200 | Refills: 2 | Status: ACTIVE | COMMUNITY
Start: 2019-12-26 | End: 1900-01-01

## 2022-03-16 RX ORDER — BLOOD SUGAR DIAGNOSTIC
STRIP MISCELLANEOUS TWICE DAILY
Qty: 180 | Refills: 2 | Status: DISCONTINUED | COMMUNITY
Start: 1900-01-01 | End: 2022-03-16

## 2022-03-16 RX ORDER — INSULIN DEGLUDEC INJECTION 100 U/ML
100 INJECTION, SOLUTION SUBCUTANEOUS AT BEDTIME
Qty: 15 | Refills: 3 | Status: ACTIVE | COMMUNITY
Start: 1900-01-01 | End: 1900-01-01

## 2022-03-16 RX ORDER — PEN NEEDLE, DIABETIC 32GX 5/32"
32G X 4 MM NEEDLE, DISPOSABLE MISCELLANEOUS
Qty: 1 | Refills: 3 | Status: ACTIVE | COMMUNITY
Start: 1900-01-01 | End: 1900-01-01

## 2022-03-16 RX ORDER — VALSARTAN AND HYDROCHLOROTHIAZIDE 160; 12.5 MG/1; MG/1
160-12.5 TABLET, FILM COATED ORAL DAILY
Qty: 90 | Refills: 1 | Status: ACTIVE | COMMUNITY
Start: 1900-01-01 | End: 1900-01-01

## 2022-03-16 NOTE — ASSESSMENT
[FreeTextEntry1] : This is a note of dictation on this 76-year-old male who came in today for a blood pressure recheck as well as a refill of his prescriptions the patient's fasting sugars are high usually over 150-200 his postprandials or address his evening sugars are very very reasonable I keep on asking him if he had any snacks before he goes to sleep at night and he denies it he's lost close to 2-1/2 pounds but it's taken him 5 months to do that I continued his medications and increased the amount of insulin he is taking to 50 units his pressure was excellent at 130/70 his heart had a regular rhythm S1 and S2 and his lungs were clear I cautioned him once again of his low-carb diet and he returned to the office for further further evaluation

## 2022-05-02 ENCOUNTER — RX RENEWAL (OUTPATIENT)
Age: 77
End: 2022-05-02

## 2022-06-13 ENCOUNTER — APPOINTMENT (OUTPATIENT)
Dept: FAMILY MEDICINE | Facility: CLINIC | Age: 77
End: 2022-06-13

## 2022-06-14 ENCOUNTER — APPOINTMENT (OUTPATIENT)
Dept: FAMILY MEDICINE | Facility: CLINIC | Age: 77
End: 2022-06-14

## 2022-06-15 ENCOUNTER — RX RENEWAL (OUTPATIENT)
Age: 77
End: 2022-06-15

## 2022-06-27 ENCOUNTER — APPOINTMENT (OUTPATIENT)
Dept: FAMILY MEDICINE | Facility: CLINIC | Age: 77
End: 2022-06-27

## 2022-07-25 ENCOUNTER — APPOINTMENT (OUTPATIENT)
Dept: FAMILY MEDICINE | Facility: CLINIC | Age: 77
End: 2022-07-25

## 2022-07-25 VITALS
TEMPERATURE: 97.4 F | HEART RATE: 85 BPM | OXYGEN SATURATION: 97 % | BODY MASS INDEX: 33.12 KG/M2 | WEIGHT: 194 LBS | HEIGHT: 64 IN | RESPIRATION RATE: 14 BRPM

## 2022-07-25 VITALS — SYSTOLIC BLOOD PRESSURE: 125 MMHG | DIASTOLIC BLOOD PRESSURE: 75 MMHG

## 2022-07-25 PROCEDURE — 99214 OFFICE O/P EST MOD 30 MIN: CPT

## 2022-07-25 RX ORDER — EMPAGLIFLOZIN AND METFORMIN HYDROCHLORIDE 12.5; 5 MG/1; MG/1
12.5-5 TABLET ORAL
Qty: 180 | Refills: 0 | Status: ACTIVE | COMMUNITY
Start: 2022-01-24 | End: 1900-01-01

## 2022-07-25 RX ORDER — INSULIN DEGLUDEC INJECTION 100 U/ML
100 INJECTION, SOLUTION SUBCUTANEOUS
Qty: 15 | Refills: 3 | Status: ACTIVE | COMMUNITY
Start: 2019-09-12 | End: 1900-01-01

## 2022-07-25 RX ORDER — METFORMIN HYDROCHLORIDE 500 MG/1
500 TABLET, COATED ORAL
Qty: 180 | Refills: 1 | Status: ACTIVE | COMMUNITY
Start: 2021-02-17 | End: 1900-01-01

## 2022-07-25 NOTE — ASSESSMENT
[FreeTextEntry1] : This is an out of the dictation on this 76-year-old male who has come in today for refills of his prescriptions and a blood pressure check the patient feels well no complaints his pressure was 125/75 his heart had a regular rhythm S1 and S2 and his lungs are clear his prescriptions for [employer] and synjardy as well as metformin were refilled and he'll return to the office when he needs additional refills

## 2022-09-14 ENCOUNTER — RX RENEWAL (OUTPATIENT)
Age: 77
End: 2022-09-14

## 2024-02-06 ENCOUNTER — OFFICE (OUTPATIENT)
Dept: URBAN - METROPOLITAN AREA CLINIC 104 | Facility: CLINIC | Age: 79
Setting detail: OPHTHALMOLOGY
End: 2024-02-06
Payer: COMMERCIAL

## 2024-02-06 DIAGNOSIS — H25.13: ICD-10-CM

## 2024-02-06 DIAGNOSIS — H43.813: ICD-10-CM

## 2024-02-06 DIAGNOSIS — H25.11: ICD-10-CM

## 2024-02-06 DIAGNOSIS — E11.9: ICD-10-CM

## 2024-02-06 PROBLEM — H25.12 CATARACT SENILE NUCLEAR SCLEROSIS; RIGHT EYE, LEFT EYE, BOTH EYES: Status: ACTIVE | Noted: 2024-02-06

## 2024-02-06 PROCEDURE — 92136 OPHTHALMIC BIOMETRY: CPT | Performed by: SPECIALIST

## 2024-02-06 PROCEDURE — 92004 COMPRE OPH EXAM NEW PT 1/>: CPT | Performed by: SPECIALIST

## 2024-02-06 PROCEDURE — 92134 CPTRZ OPH DX IMG PST SGM RTA: CPT | Performed by: SPECIALIST

## 2024-02-06 ASSESSMENT — REFRACTION_MANIFEST
OS_ADD: +2.50
OD_SPHERE: +1.75
OD_ADD: +2.50
OS_SPHERE: +1.00
OS_CYLINDER: -2.00
OD_AXIS: 075
OD_VA1: 20/40
OS_VA1: 20/40
OU_VA: 20/40
OD_CYLINDER: -2.50
OS_AXIS: 075

## 2024-02-06 ASSESSMENT — REFRACTION_CURRENTRX
OS_CYLINDER: -2.00
OD_SPHERE: +1.75
OD_CYLINDER: -2.50
OS_AXIS: 074
OS_OVR_VA: 20/
OD_AXIS: 074
OD_ADD: +2.25
OD_OVR_VA: 20/
OS_ADD: +2.25
OS_SPHERE: +0.75

## 2024-02-06 ASSESSMENT — CONFRONTATIONAL VISUAL FIELD TEST (CVF)
OS_FINDINGS: FULL
OD_FINDINGS: FULL

## 2024-02-06 ASSESSMENT — SPHEQUIV_DERIVED
OS_SPHEQUIV: 0.375
OD_SPHEQUIV: 0.75
OD_SPHEQUIV: 0.5
OS_SPHEQUIV: 0

## 2024-02-06 ASSESSMENT — REFRACTION_AUTOREFRACTION
OS_SPHERE: +1.25
OS_AXIS: 094
OD_AXIS: 085
OS_CYLINDER: -1.75
OD_SPHERE: +2.00
OD_CYLINDER: -2.50

## 2024-09-03 ENCOUNTER — NON-APPOINTMENT (OUTPATIENT)
Age: 79
End: 2024-09-03

## 2024-11-19 ENCOUNTER — OFFICE (OUTPATIENT)
Dept: URBAN - METROPOLITAN AREA CLINIC 104 | Facility: CLINIC | Age: 79
Setting detail: OPHTHALMOLOGY
End: 2024-11-19
Payer: COMMERCIAL

## 2024-11-19 DIAGNOSIS — H18.513: ICD-10-CM

## 2024-11-19 DIAGNOSIS — E11.9: ICD-10-CM

## 2024-11-19 DIAGNOSIS — H43.813: ICD-10-CM

## 2024-11-19 DIAGNOSIS — H25.13: ICD-10-CM

## 2024-11-19 PROCEDURE — 92014 COMPRE OPH EXAM EST PT 1/>: CPT | Performed by: SPECIALIST

## 2024-11-19 ASSESSMENT — REFRACTION_AUTOREFRACTION
OD_SPHERE: +1.75
OS_AXIS: 080
OS_SPHERE: +0.50
OS_CYLINDER: -1.00
OD_CYLINDER: -2.50
OD_AXIS: 081

## 2024-11-19 ASSESSMENT — CORNEAL DYSTROPHY - POSTERIOR
OD_POSTERIORDYSTROPHY: 2+ GUTTATA
OS_POSTERIORDYSTROPHY: 2+ GUTTATA

## 2024-11-19 ASSESSMENT — KERATOMETRY
OS_K1POWER_DIOPTERS: 43.89
OD_K2POWER_DIOPTERS: 44.88
OS_AXISANGLE_DEGREES: 006
OD_K1POWER_DIOPTERS: 43.77
OS_K2POWER_DIOPTERS: 44.64
OD_AXISANGLE_DEGREES: 160

## 2024-11-19 ASSESSMENT — REFRACTION_CURRENTRX
OS_SPHERE: +0.75
OD_SPHERE: +1.75
OS_CYLINDER: -2.00
OD_VPRISM_DIRECTION: PROGS
OS_VPRISM_DIRECTION: PROGS
OD_OVR_VA: 20/
OD_ADD: +2.25
OS_AXIS: 076
OD_CYLINDER: -2.25
OS_ADD: +2.25
OD_AXIS: 075
OS_OVR_VA: 20/

## 2024-11-19 ASSESSMENT — CONFRONTATIONAL VISUAL FIELD TEST (CVF)
OD_FINDINGS: FULL
OS_FINDINGS: FULL

## 2024-11-19 ASSESSMENT — VISUAL ACUITY
OD_BCVA: 20/25
OS_BCVA: 20/40

## 2024-11-19 ASSESSMENT — TONOMETRY
OS_IOP_MMHG: 16
OD_IOP_MMHG: 16

## 2024-12-03 ENCOUNTER — NON-APPOINTMENT (OUTPATIENT)
Age: 79
End: 2024-12-03

## 2025-02-16 ENCOUNTER — NON-APPOINTMENT (OUTPATIENT)
Age: 80
End: 2025-02-16

## 2025-04-18 ENCOUNTER — NON-APPOINTMENT (OUTPATIENT)
Age: 80
End: 2025-04-18

## 2025-05-27 ENCOUNTER — OFFICE (OUTPATIENT)
Dept: URBAN - METROPOLITAN AREA CLINIC 104 | Facility: CLINIC | Age: 80
Setting detail: OPHTHALMOLOGY
End: 2025-05-27
Payer: COMMERCIAL

## 2025-05-27 DIAGNOSIS — H35.373: ICD-10-CM

## 2025-05-27 DIAGNOSIS — H25.11: ICD-10-CM

## 2025-05-27 DIAGNOSIS — H25.13: ICD-10-CM

## 2025-05-27 PROBLEM — H25.12 CATARACT SENILE NUCLEAR SCLEROSIS; RIGHT EYE, LEFT EYE, BOTH EYES ;
WITH CORTICAL OU: Status: ACTIVE | Noted: 2025-05-27

## 2025-05-27 PROCEDURE — 92134 CPTRZ OPH DX IMG PST SGM RTA: CPT | Performed by: SPECIALIST

## 2025-05-27 PROCEDURE — 92014 COMPRE OPH EXAM EST PT 1/>: CPT | Performed by: SPECIALIST

## 2025-05-27 PROCEDURE — 92136 OPHTHALMIC BIOMETRY: CPT | Performed by: SPECIALIST

## 2025-05-27 ASSESSMENT — KERATOMETRY
OD_K1K2_AVERAGE: 44.39
OD_K1POWER_DIOPTERS: 43.66
OD_CYLAXISANGLE_DEGREES: 163
OS_K1POWER_DIOPTERS: 44.18
OD_AXISANGLE2_DEGREES: 163
OS_AXISANGLE_DEGREES: 10
OS_AXISANGLE_DEGREES: 10
OS_AXISANGLE2_DEGREES: 10
OS_K1POWER_DIOPTERS: 44.18
OD_AXISANGLE_DEGREES: 163
OS_K2POWER_DIOPTERS: 44.64
OD_AXISANGLE_DEGREES: 163
OD_K2POWER_DIOPTERS: 45.12
OD_K1POWER_DIOPTERS: 43.66
OD_CYLPOWER_DEGREES: 1.46
OS_K2POWER_DIOPTERS: 44.64
OS_CYLAXISANGLE_DEGREES: 10
OD_K2POWER_DIOPTERS: 45.12
OS_K1K2_AVERAGE: 44.41
OS_CYLPOWER_DEGREES: 0.46000000

## 2025-05-27 ASSESSMENT — REFRACTION_MANIFEST
OD_SPHERE: +2.00
OD_ADD: +2.50
OD_VA1: 20/40-
OD_AXIS: 75
OS_AXIS: 75
OS_ADD: +2.50
OS_CYLINDER: -2.00
OS_VA2: 20/30(J2)
OS_SPHERE: +1.50
OS_VA1: 20/40-
OD_VA2: 20/30(J2)
OD_CYLINDER: -2.00

## 2025-05-27 ASSESSMENT — REFRACTION_AUTOREFRACTION
OS_CYLINDER: -2.00
OD_AXIS: 82
OD_SPHERE: +2.00
OD_CYLINDER: -2.75
OS_SPHERE: +1.75
OS_AXIS: 86

## 2025-05-27 ASSESSMENT — VISUAL ACUITY
OD_BCVA: 20/50
OS_BCVA: 20/50

## 2025-05-27 ASSESSMENT — REFRACTION_CURRENTRX
OD_OVR_VA: 20/
OS_OVR_VA: 20/

## 2025-05-27 ASSESSMENT — TONOMETRY
OD_IOP_MMHG: 16
OS_IOP_MMHG: 16

## 2025-05-27 ASSESSMENT — CORNEAL DYSTROPHY - POSTERIOR
OS_POSTERIORDYSTROPHY: 2+ GUTTATA
OD_POSTERIORDYSTROPHY: 2+ GUTTATA

## 2025-05-27 ASSESSMENT — CONFRONTATIONAL VISUAL FIELD TEST (CVF)
OS_FINDINGS: FULL
OD_FINDINGS: FULL

## 2025-06-19 ENCOUNTER — RX ONLY (RX ONLY)
Age: 80
End: 2025-06-19

## 2025-06-20 ENCOUNTER — ASC (OUTPATIENT)
Dept: URBAN - METROPOLITAN AREA SURGERY 8 | Facility: SURGERY | Age: 80
Setting detail: OPHTHALMOLOGY
End: 2025-06-20
Payer: COMMERCIAL

## 2025-06-20 DIAGNOSIS — H52.221: ICD-10-CM

## 2025-06-20 DIAGNOSIS — H25.11: ICD-10-CM

## 2025-06-20 PROCEDURE — FEMTO PRECISION LASER CATARACT SURGERY: Mod: GY | Performed by: SPECIALIST

## 2025-06-20 PROCEDURE — V2787 ASTIGMATISM-CORRECT FUNCTION: HCPCS | Performed by: SPECIALIST

## 2025-06-20 PROCEDURE — 66984 XCAPSL CTRC RMVL W/O ECP: CPT | Mod: RT | Performed by: SPECIALIST

## 2025-06-21 ENCOUNTER — OFFICE (OUTPATIENT)
Dept: URBAN - METROPOLITAN AREA CLINIC 104 | Facility: CLINIC | Age: 80
Setting detail: OPHTHALMOLOGY
End: 2025-06-21
Payer: COMMERCIAL

## 2025-06-21 DIAGNOSIS — Z96.1: ICD-10-CM

## 2025-06-21 PROCEDURE — 99024 POSTOP FOLLOW-UP VISIT: CPT | Performed by: OPTOMETRIST

## 2025-06-21 ASSESSMENT — REFRACTION_MANIFEST
OD_VA1: 20/40-
OS_AXIS: 75
OS_VA1: 20/40-
OD_CYLINDER: -2.00
OS_VA2: 20/30(J2)
OD_SPHERE: +2.00
OD_VA2: 20/30(J2)
OS_SPHERE: +1.50
OS_ADD: +2.50
OD_AXIS: 75
OS_CYLINDER: -2.00
OD_ADD: +2.50

## 2025-06-21 ASSESSMENT — REFRACTION_AUTOREFRACTION
OD_CYLINDER: -2.75
OD_AXIS: 82
OD_SPHERE: +2.00
OS_SPHERE: +1.75
OS_AXIS: 86
OS_CYLINDER: -2.00

## 2025-06-21 ASSESSMENT — CONFRONTATIONAL VISUAL FIELD TEST (CVF)
OS_FINDINGS: FULL
OD_FINDINGS: FULL

## 2025-06-21 ASSESSMENT — REFRACTION_CURRENTRX
OD_OVR_VA: 20/
OS_OVR_VA: 20/

## 2025-06-21 ASSESSMENT — KERATOMETRY
OD_AXISANGLE_DEGREES: 163
OD_K2POWER_DIOPTERS: 45.12
OS_K2POWER_DIOPTERS: 44.64
OD_K1POWER_DIOPTERS: 43.66
OS_K1POWER_DIOPTERS: 44.18
OS_AXISANGLE_DEGREES: 10

## 2025-06-21 ASSESSMENT — VISUAL ACUITY
OD_BCVA: 20/50
OS_BCVA: 20/25

## 2025-06-21 ASSESSMENT — CORNEAL DYSTROPHY - POSTERIOR
OS_POSTERIORDYSTROPHY: 2+ GUTTATA
OD_POSTERIORDYSTROPHY: 2+ GUTTATA

## 2025-06-25 ENCOUNTER — OFFICE (OUTPATIENT)
Dept: URBAN - METROPOLITAN AREA CLINIC 104 | Facility: CLINIC | Age: 80
Setting detail: OPHTHALMOLOGY
End: 2025-06-25
Payer: COMMERCIAL

## 2025-06-25 DIAGNOSIS — H25.12: ICD-10-CM

## 2025-06-25 DIAGNOSIS — Z96.1: ICD-10-CM

## 2025-06-25 PROCEDURE — 99024 POSTOP FOLLOW-UP VISIT: CPT | Performed by: OPTOMETRIST

## 2025-06-25 PROCEDURE — 92136 OPHTHALMIC BIOMETRY: CPT | Performed by: SPECIALIST

## 2025-06-25 ASSESSMENT — REFRACTION_MANIFEST
OS_VA2: 20/30(J2)
OS_SPHERE: +1.50
OS_CYLINDER: -2.00
OD_VA1: 20/40-
OD_CYLINDER: -2.00
OD_ADD: +2.50
OD_VA2: 20/30(J2)
OS_SPHERE: +1.50
OS_CYLINDER: -2.00
OS_ADD: +2.50
OD_AXIS: 75
OD_SPHERE: +2.00
OD_ADD: +2.50
OD_SPHERE: +2.00
OS_VA1: 20/40-
OS_VA2: 20/30(J2)
OS_AXIS: 75
OS_VA1: 20/40-
OD_VA1: 20/40-
OD_VA2: 20/30(J2)
OD_CYLINDER: -2.00
OD_AXIS: 75
OS_AXIS: 75
OS_ADD: +2.50

## 2025-06-25 ASSESSMENT — REFRACTION_AUTOREFRACTION
OD_CYLINDER: -0.25
OS_AXIS: 079
OS_SPHERE: +1.50
OS_SPHERE: +1.50
OS_AXIS: 079
OS_CYLINDER: -2.25
OD_SPHERE: 0.00
OD_AXIS: 119
OD_SPHERE: 0.00
OS_CYLINDER: -2.25
OD_CYLINDER: -0.25
OD_AXIS: 119

## 2025-06-25 ASSESSMENT — VISUAL ACUITY
OS_BCVA: 20/25
OD_BCVA: 20/30-2
OD_BCVA: 20/30-2
OS_BCVA: 20/25

## 2025-06-25 ASSESSMENT — CORNEAL EDEMA - FOLDS/STRIAE
OD_FOLDSSTRIAE: 1+
OD_FOLDSSTRIAE: 1+

## 2025-06-25 ASSESSMENT — KERATOMETRY
OD_AXISANGLE_DEGREES: 157
OD_AXISANGLE_DEGREES: 157
OS_K2POWER_DIOPTERS: 44.94
OD_K1POWER_DIOPTERS: 43.55
OS_AXISANGLE_DEGREES: 179
OS_AXISANGLE_DEGREES: 179
OD_K2POWER_DIOPTERS: 44.94
OS_K1POWER_DIOPTERS: 43.77
OS_K2POWER_DIOPTERS: 44.94
OD_K2POWER_DIOPTERS: 44.94
OS_K1POWER_DIOPTERS: 43.77
OD_K1POWER_DIOPTERS: 43.55

## 2025-06-25 ASSESSMENT — CONFRONTATIONAL VISUAL FIELD TEST (CVF)
OS_FINDINGS: FULL
OD_FINDINGS: FULL
OD_FINDINGS: FULL
OS_FINDINGS: FULL

## 2025-06-25 ASSESSMENT — CORNEAL DYSTROPHY - POSTERIOR
OS_POSTERIORDYSTROPHY: 2+ GUTTATA
OS_POSTERIORDYSTROPHY: 2+ GUTTATA
OD_POSTERIORDYSTROPHY: 2+ GUTTATA
OD_POSTERIORDYSTROPHY: 2+ GUTTATA

## 2025-06-25 ASSESSMENT — REFRACTION_CURRENTRX
OS_OVR_VA: 20/
OD_OVR_VA: 20/
OS_OVR_VA: 20/
OD_OVR_VA: 20/

## 2025-06-27 ENCOUNTER — RX ONLY (RX ONLY)
Age: 80
End: 2025-06-27

## 2025-06-27 ENCOUNTER — ASC (OUTPATIENT)
Dept: URBAN - METROPOLITAN AREA SURGERY 8 | Facility: SURGERY | Age: 80
Setting detail: OPHTHALMOLOGY
End: 2025-06-27
Payer: COMMERCIAL

## 2025-06-27 DIAGNOSIS — H25.12: ICD-10-CM

## 2025-06-27 DIAGNOSIS — H52.222: ICD-10-CM

## 2025-06-27 PROCEDURE — FEMTO PRECISION LASER CATARACT SURGERY: Mod: GY,LT | Performed by: SPECIALIST

## 2025-06-27 PROCEDURE — 66984 XCAPSL CTRC RMVL W/O ECP: CPT | Mod: 79,LT | Performed by: SPECIALIST

## 2025-06-28 ENCOUNTER — OFFICE (OUTPATIENT)
Dept: URBAN - METROPOLITAN AREA CLINIC 104 | Facility: CLINIC | Age: 80
Setting detail: OPHTHALMOLOGY
End: 2025-06-28
Payer: COMMERCIAL

## 2025-06-28 DIAGNOSIS — Z96.1: ICD-10-CM

## 2025-06-28 PROCEDURE — 99024 POSTOP FOLLOW-UP VISIT: CPT | Performed by: OPTOMETRIST

## 2025-06-28 ASSESSMENT — REFRACTION_MANIFEST
OS_VA2: 20/30(J2)
OS_AXIS: 75
OS_VA1: 20/40-
OD_CYLINDER: -2.00
OD_SPHERE: +2.00
OS_ADD: +2.50
OS_SPHERE: +1.50
OD_AXIS: 75
OD_VA1: 20/40-
OS_CYLINDER: -2.00
OD_VA2: 20/30(J2)
OD_ADD: +2.50

## 2025-06-28 ASSESSMENT — REFRACTION_CURRENTRX
OD_OVR_VA: 20/
OS_OVR_VA: 20/

## 2025-06-28 ASSESSMENT — KERATOMETRY
OD_AXISANGLE_DEGREES: 157
OD_K1POWER_DIOPTERS: 43.55
OD_K2POWER_DIOPTERS: 44.94

## 2025-06-28 ASSESSMENT — CORNEAL DYSTROPHY - POSTERIOR
OS_POSTERIORDYSTROPHY: 2+ GUTTATA
OD_POSTERIORDYSTROPHY: 2+ GUTTATA

## 2025-06-28 ASSESSMENT — CONFRONTATIONAL VISUAL FIELD TEST (CVF)
OS_FINDINGS: FULL
OD_FINDINGS: FULL

## 2025-06-28 ASSESSMENT — CORNEAL EDEMA - FOLDS/STRIAE
OD_FOLDSSTRIAE: 1+
OS_FOLDSSTRIAE: 1+ 2+

## 2025-06-28 ASSESSMENT — REFRACTION_AUTOREFRACTION
OD_CYLINDER: -0.25
OD_AXIS: 119
OD_SPHERE: 0.00

## 2025-06-28 ASSESSMENT — CORNEAL EDEMA - MICROCYSTIC EPITHELIAL EDEMA (MCE): OS_MCE: 1+

## 2025-06-28 ASSESSMENT — VISUAL ACUITY
OS_BCVA: 20/25
OD_BCVA: 20/50

## 2025-07-03 ENCOUNTER — OFFICE (OUTPATIENT)
Dept: URBAN - METROPOLITAN AREA CLINIC 104 | Facility: CLINIC | Age: 80
Setting detail: OPHTHALMOLOGY
End: 2025-07-03
Payer: COMMERCIAL

## 2025-07-03 DIAGNOSIS — Z96.1: ICD-10-CM

## 2025-07-03 PROCEDURE — 99024 POSTOP FOLLOW-UP VISIT: CPT | Performed by: OPTOMETRIST

## 2025-07-03 ASSESSMENT — REFRACTION_MANIFEST
OD_ADD: +2.50
OS_ADD: +2.50
OS_AXIS: 75
OD_VA2: 20/30(J2)
OD_AXIS: 75
OS_VA1: 20/40-
OS_CYLINDER: -2.00
OD_SPHERE: +2.00
OS_SPHERE: +1.50
OD_CYLINDER: -2.00
OS_VA2: 20/30(J2)
OD_VA1: 20/40-

## 2025-07-03 ASSESSMENT — KERATOMETRY
OS_K2POWER_DIOPTERS: 44.29
OS_AXISANGLE_DEGREES: 168
OD_K1POWER_DIOPTERS: 43.66
OD_AXISANGLE_DEGREES: 158
OD_K2POWER_DIOPTERS: 45.18
OS_K1POWER_DIOPTERS: 43.49

## 2025-07-03 ASSESSMENT — CORNEAL EDEMA - MICROCYSTIC EPITHELIAL EDEMA (MCE): OS_MCE: T

## 2025-07-03 ASSESSMENT — REFRACTION_AUTOREFRACTION
OS_AXIS: 93
OD_AXIS: 105
OD_SPHERE: +0.25
OS_CYLINDER: -0.75
OD_CYLINDER: -1.00
OS_SPHERE: +1.00

## 2025-07-03 ASSESSMENT — CORNEAL DYSTROPHY - POSTERIOR
OD_POSTERIORDYSTROPHY: 2+ GUTTATA
OS_POSTERIORDYSTROPHY: 2+ GUTTATA

## 2025-07-03 ASSESSMENT — CORNEAL EDEMA - FOLDS/STRIAE: OS_FOLDSSTRIAE: T

## 2025-07-03 ASSESSMENT — REFRACTION_CURRENTRX
OS_OVR_VA: 20/
OD_OVR_VA: 20/

## 2025-07-03 ASSESSMENT — VISUAL ACUITY
OS_BCVA: 20/20-2
OD_BCVA: 20/20-2

## 2025-07-03 ASSESSMENT — CONFRONTATIONAL VISUAL FIELD TEST (CVF)
OD_FINDINGS: FULL
OS_FINDINGS: FULL

## 2025-07-31 ENCOUNTER — OFFICE (OUTPATIENT)
Dept: URBAN - METROPOLITAN AREA CLINIC 104 | Facility: CLINIC | Age: 80
Setting detail: OPHTHALMOLOGY
End: 2025-07-31
Payer: COMMERCIAL

## 2025-07-31 DIAGNOSIS — Z96.1: ICD-10-CM

## 2025-07-31 PROCEDURE — 99024 POSTOP FOLLOW-UP VISIT: CPT | Performed by: OPTOMETRIST

## 2025-07-31 ASSESSMENT — REFRACTION_MANIFEST
OS_SPHERE: -0.25
OD_ADD: +2.50
OD_SPHERE: -0.50
OD_AXIS: 045
OD_VA2: 20/30(J2)
OS_CYLINDER: -0.50
OS_AXIS: 090
OD_VA1: 20/25
OS_VA2: 20/30(J2)
OS_ADD: +2.50
OD_CYLINDER: -0.50
OS_VA1: 20/30+

## 2025-07-31 ASSESSMENT — REFRACTION_CURRENTRX
OS_OVR_VA: 20/
OD_OVR_VA: 20/

## 2025-07-31 ASSESSMENT — VISUAL ACUITY
OS_BCVA: 20/25+1
OD_BCVA: 20/30-1

## 2025-07-31 ASSESSMENT — CONFRONTATIONAL VISUAL FIELD TEST (CVF)
OS_FINDINGS: FULL
OD_FINDINGS: FULL